# Patient Record
Sex: FEMALE | Race: BLACK OR AFRICAN AMERICAN | NOT HISPANIC OR LATINO | URBAN - METROPOLITAN AREA
[De-identification: names, ages, dates, MRNs, and addresses within clinical notes are randomized per-mention and may not be internally consistent; named-entity substitution may affect disease eponyms.]

---

## 2019-06-01 ENCOUNTER — EMERGENCY (EMERGENCY)
Facility: HOSPITAL | Age: 54
LOS: 1 days | Discharge: HOME | End: 2019-06-01
Attending: EMERGENCY MEDICINE | Admitting: EMERGENCY MEDICINE
Payer: COMMERCIAL

## 2019-06-01 VITALS
DIASTOLIC BLOOD PRESSURE: 75 MMHG | SYSTOLIC BLOOD PRESSURE: 160 MMHG | HEART RATE: 56 BPM | RESPIRATION RATE: 17 BRPM | OXYGEN SATURATION: 99 % | TEMPERATURE: 98 F

## 2019-06-01 PROCEDURE — 99283 EMERGENCY DEPT VISIT LOW MDM: CPT

## 2019-06-01 RX ORDER — POLYMYXIN B SULF/TRIMETHOPRIM 10000-1/ML
1 DROPS OPHTHALMIC (EYE) ONCE
Refills: 0 | Status: COMPLETED | OUTPATIENT
Start: 2019-06-01 | End: 2019-06-01

## 2019-06-01 RX ADMIN — Medication 1 DROP(S): at 09:57

## 2019-06-01 NOTE — ED PROVIDER NOTE - NS_ATTENDINGSCRIBE_ED_ALL_ED
fahrenheit/101.0 I personally performed the service described in the documentation recorded by the scribe in my presence, and it accurately and completely records my words and actions.

## 2019-06-01 NOTE — ED PROVIDER NOTE - OBJECTIVE STATEMENT
53 year old female presents to the ED c/o left eye trauma due to a kitten scratch. She reports she is able to see through the eye. Denies any blurred vision, but reports pain in left eye and feels as if something is in the eye.

## 2025-04-28 PROBLEM — Z78.9 OTHER SPECIFIED HEALTH STATUS: Chronic | Status: ACTIVE | Noted: 2019-06-01

## 2025-05-05 ENCOUNTER — APPOINTMENT (OUTPATIENT)
Dept: ORTHOPEDIC SURGERY | Facility: CLINIC | Age: 60
End: 2025-05-05
Payer: MEDICARE

## 2025-05-05 DIAGNOSIS — Z78.9 OTHER SPECIFIED HEALTH STATUS: ICD-10-CM

## 2025-05-05 DIAGNOSIS — Z00.00 ENCOUNTER FOR GENERAL ADULT MEDICAL EXAMINATION W/OUT ABNORMAL FINDINGS: ICD-10-CM

## 2025-05-05 DIAGNOSIS — M53.9 DORSOPATHY, UNSPECIFIED: ICD-10-CM

## 2025-05-05 DIAGNOSIS — M54.50 LOW BACK PAIN, UNSPECIFIED: ICD-10-CM

## 2025-05-05 PROCEDURE — 99203 OFFICE O/P NEW LOW 30 MIN: CPT

## 2025-05-05 PROCEDURE — 72170 X-RAY EXAM OF PELVIS: CPT

## 2025-05-05 PROCEDURE — 72100 X-RAY EXAM L-S SPINE 2/3 VWS: CPT

## 2025-07-08 ENCOUNTER — EMERGENCY (EMERGENCY)
Facility: HOSPITAL | Age: 60
LOS: 1 days | End: 2025-07-08
Attending: EMERGENCY MEDICINE | Admitting: EMERGENCY MEDICINE
Payer: MEDICARE

## 2025-07-08 VITALS
TEMPERATURE: 98 F | HEART RATE: 61 BPM | OXYGEN SATURATION: 100 % | WEIGHT: 259.93 LBS | RESPIRATION RATE: 16 BRPM | SYSTOLIC BLOOD PRESSURE: 145 MMHG | DIASTOLIC BLOOD PRESSURE: 79 MMHG

## 2025-07-08 PROCEDURE — 99284 EMERGENCY DEPT VISIT MOD MDM: CPT

## 2025-07-08 NOTE — ED ADULT TRIAGE NOTE - CHIEF COMPLAINT QUOTE
pt complains of s/p slip in fall  down 3 steps with head strike and L hip pain. pt with no abrasions and lacerations noted  .  pt ambulatory with assistance with even and steady gait. pt denies blood thinners, LOC,  changes in vision, chest pain, headache, nausea, dizziness, vomiting,  SOB, fever, and  chills. pt denies past medical hx .

## 2025-07-09 PROCEDURE — 73090 X-RAY EXAM OF FOREARM: CPT | Mod: 26,LT

## 2025-07-09 PROCEDURE — 73130 X-RAY EXAM OF HAND: CPT | Mod: 26,LT

## 2025-07-09 PROCEDURE — 73110 X-RAY EXAM OF WRIST: CPT | Mod: 26,LT

## 2025-07-09 RX ORDER — LIDOCAINE HYDROCHLORIDE 20 MG/ML
1 JELLY TOPICAL ONCE
Refills: 0 | Status: COMPLETED | OUTPATIENT
Start: 2025-07-09 | End: 2025-07-09

## 2025-07-09 RX ORDER — ACETAMINOPHEN 500 MG/5ML
975 LIQUID (ML) ORAL ONCE
Refills: 0 | Status: COMPLETED | OUTPATIENT
Start: 2025-07-09 | End: 2025-07-09

## 2025-07-09 RX ORDER — IBUPROFEN 200 MG
400 TABLET ORAL ONCE
Refills: 0 | Status: DISCONTINUED | OUTPATIENT
Start: 2025-07-09 | End: 2025-07-09

## 2025-07-09 RX ADMIN — Medication 975 MILLIGRAM(S): at 01:23

## 2025-07-09 RX ADMIN — LIDOCAINE HYDROCHLORIDE 1 PATCH: 20 JELLY TOPICAL at 01:23

## 2025-07-09 NOTE — ED ADULT NURSE NOTE - OBJECTIVE STATEMENT
A&Ox4. Past medical history of gastric ulcers. Presents to the ED s/p fall while going down her outside stair in the rain with slippers on. Pt states she hit the back of her head with pain on the left side of her head, associated with a headache. Denies LOC.  Denies CP, SOB, or N/V/D. Respirations even and unlabored. 2 Awaiting diagnostic testing. Safety precautions in place.

## 2025-07-09 NOTE — ED ADULT NURSE NOTE - NSFALLRISKINTERV_ED_ALL_ED

## 2025-07-09 NOTE — ED PROVIDER NOTE - PROGRESS NOTE DETAILS
Kev Anthony MD, PGY2: Pt states she does not want to stay for Ct at this time. Pt has negative NEXUS criteria. Pt given return precautions. Plan to dc pt with neuro f/u.

## 2025-07-09 NOTE — ED PROVIDER NOTE - PATIENT PORTAL LINK FT
You can access the FollowMyHealth Patient Portal offered by [facility] by registering at the following website: [email or web address]. By joining [facility]’s FollowMyHealth portal, you will also be able to view your health information using other applications (apps) compatible with our system.
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
## 2025-07-09 NOTE — ED PROVIDER NOTE - ATTENDING CONTRIBUTION TO CARE
59-year-old female no past medical history presenting status post mechanical fall earlier in the night.  Patient states she tripped and fell on the concrete down approximately 3 steps hitting back of her head holding at her left hand for the fall of note patient is right-handed.  Patient was able to get herself she did not lose consciousness she remembers the entirety of the event.  Patient states that she is having some pain in the left neck/back of head left hand and left back.  No numbness no tingling no nausea no vomiting friend is with her she is acting appropriately she does not have any chest pain any shortness of breath any abdominal pain no syncope.  Patient does not take any blood thinning medications.  Patient states was having some pain by the thenar eminence of the left hand that she no longer has.    Patient very well-appearing she is completely neurologically intact Nexus criteria does not support CAT scan brain C-spine.  Will x-ray hand/wrist to ensure no fracture patient does not have any areas that are tender to palpation she is able to fully range pulses are intact able to make an okay sign and the thumbs up with ease no pain or tenderness to palpation at scaphoid no midline tenderness to C-spine T-spine L-spine no numbness tingling strength and sensation intact.  No evidence of bruising.  Patient ambulatory. Given instructions for supportive care ( OTC meds/heat/lido patches),    Patient given strict return precautions.  She verbalized with understanding told if she is feeling confused has multiple episodes of vomiting headache that will not go away or any other concerning signs or symptoms she can immediately to return to the ER.  Patient given information for postconcussive symptoms and will be given neurology follow-up if symptoms persist.Told if she has pain to the arm she can obtain thumb spica and she may need to return or follow-up with PCP for repeat x-ray as some small hairline fractures can present better when they start to heal
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
## 2025-07-09 NOTE — ED PROVIDER NOTE - PHYSICAL EXAMINATION
General: Alert and Orientated x 3. No apparent distress.  Eyes: No scleral icterus.   ENT: Mucous membranes moist  Cardiac: No murmurs appreciated.   Pulmonary: CTA bilaterally. No increased WOB.   Abdominal: Soft, Non-distended, non-tender  Neurologic: No focal sensory or motor deficits.  Extremities: No lower extremity edema, bruising, soreness   L wrist tenderness to palpation with full ROM and no deformities, no snuff box tenderness. L sided back tenderness to palpation.   Skin: Color appropriate for race. Intact, warm, and well-perfused. no laceration or abrasions.  Psychiatric: Appropriate mood and affect. No apparent risk to self or others. General: Alert and Orientated x 3. No apparent distress.  Eyes: No scleral icterus.   HEENT: L sided occiptal tenderness to palpation, no bruising,  full ROM of neck without pain, PERRLA, Mucous membranes moist  Cardiac: No murmurs appreciated.   Pulmonary: CTA bilaterally. No increased WOB.   Abdominal: Soft, Non-distended, non-tender  Neurologic: No focal sensory or motor deficits.  Extremities/MSK: No lower extremity edema, bruising, soreness   L wrist tenderness to palpation with full ROM and no deformities, no snuff box tenderness. L sided back tenderness to palpation. Midline spine without tenderness or stepoffs.  Skin: Color appropriate for race. Intact, warm, and well-perfused. no laceration or abrasions.  Psychiatric: Appropriate mood and affect. No apparent risk to self or others. General: Alert and Orientated x 3. No apparent distress.  Eyes: No scleral icterus.   HEENT: L sided occiptal tenderness to palpation, no bruising,  full ROM of neck without pain, PERRLA, Mucous membranes moist  Cardiac: No murmurs appreciated.   Pulmonary: CTA bilaterally. No increased WOB.   Abdominal: Soft, Non-distended, non-tender  Neurologic: No focal sensory or motor deficits.  Extremities/MSK: No lower extremity edema, bruising, soreness   L wrist tenderness to palpation with full ROM and no deformities, no snuff box tenderness. L sided back tenderness to palpation. Midline spine without tenderness or stepoffs.  Stable pelvis, no hip tenderness to palpation, full ROM of b/l hips without pain.  Skin: Color appropriate for race. Intact, warm, and well-perfused. no laceration or abrasions.  Psychiatric: Appropriate mood and affect. No apparent risk to self or others.

## 2025-07-09 NOTE — ED PROVIDER NOTE - OBJECTIVE STATEMENT
59-year-old female no medical history presents s/p mechanical fall earlier in the night. Pt states she was walking down her outdoor concrete stairs in slippers, it was raining, pt slipped and hit the back of her head on the hand railing, and broke her fall with her L hand while sliding down 3 stairs. Denies LOC, n/v, CP, SOB, abd pain, vision changes, photophobia. Pt states she has a L sided headache. 59-year-old female no medical history presents s/p mechanical fall earlier in the night. Pt states she was walking down her outdoor concrete stairs in slippers, it was raining, pt slipped and hit the back of her head on the hand railing, and broke her fall with her L hand while sliding down 3 stairs. Pt states she has a mild L sided headache.  Denies LOC, n/v, CP, SOB, abd pain, vision changes, photophobia.

## 2025-09-09 ENCOUNTER — APPOINTMENT (OUTPATIENT)
Dept: GASTROENTEROLOGY | Facility: CLINIC | Age: 60
End: 2025-09-09
Payer: MEDICARE

## 2025-09-09 VITALS
OXYGEN SATURATION: 98 % | RESPIRATION RATE: 12 BRPM | HEART RATE: 88 BPM | DIASTOLIC BLOOD PRESSURE: 80 MMHG | SYSTOLIC BLOOD PRESSURE: 130 MMHG | WEIGHT: 264 LBS

## 2025-09-09 DIAGNOSIS — K25.9 GASTRIC ULCER, UNSPECIFIED AS ACUTE OR CHRONIC, W/OUT HEMORRHAGE OR PERFORATION: ICD-10-CM

## 2025-09-09 DIAGNOSIS — Z78.9 OTHER SPECIFIED HEALTH STATUS: ICD-10-CM

## 2025-09-09 DIAGNOSIS — Z12.11 ENCOUNTER FOR SCREENING FOR MALIGNANT NEOPLASM OF COLON: ICD-10-CM

## 2025-09-09 DIAGNOSIS — M54.50 LOW BACK PAIN, UNSPECIFIED: ICD-10-CM

## 2025-09-09 DIAGNOSIS — K21.9 GASTRO-ESOPHAGEAL REFLUX DISEASE W/OUT ESOPHAGITIS: ICD-10-CM

## 2025-09-09 PROCEDURE — 99204 OFFICE O/P NEW MOD 45 MIN: CPT

## 2025-09-09 RX ORDER — SODIUM SULFATE, POTASSIUM SULFATE AND MAGNESIUM SULFATE 1.6; 3.13; 17.5 G/177ML; G/177ML; G/177ML
17.5-3.13-1.6 SOLUTION ORAL TWICE DAILY
Qty: 2 | Refills: 0 | Status: ACTIVE | COMMUNITY
Start: 2025-09-09 | End: 1900-01-01